# Patient Record
Sex: MALE | Race: BLACK OR AFRICAN AMERICAN | NOT HISPANIC OR LATINO | Employment: UNEMPLOYED | URBAN - METROPOLITAN AREA
[De-identification: names, ages, dates, MRNs, and addresses within clinical notes are randomized per-mention and may not be internally consistent; named-entity substitution may affect disease eponyms.]

---

## 2024-06-13 ENCOUNTER — APPOINTMENT (OUTPATIENT)
Dept: CARDIOLOGY | Facility: HOSPITAL | Age: 69
End: 2024-06-13
Payer: OTHER GOVERNMENT

## 2024-06-13 ENCOUNTER — HOSPITAL ENCOUNTER (EMERGENCY)
Facility: HOSPITAL | Age: 69
Discharge: HOME | End: 2024-06-13
Attending: EMERGENCY MEDICINE
Payer: OTHER GOVERNMENT

## 2024-06-13 ENCOUNTER — APPOINTMENT (OUTPATIENT)
Dept: RADIOLOGY | Facility: HOSPITAL | Age: 69
End: 2024-06-13
Payer: OTHER GOVERNMENT

## 2024-06-13 VITALS
WEIGHT: 180 LBS | HEIGHT: 72 IN | OXYGEN SATURATION: 94 % | BODY MASS INDEX: 24.38 KG/M2 | DIASTOLIC BLOOD PRESSURE: 87 MMHG | RESPIRATION RATE: 14 BRPM | HEART RATE: 80 BPM | SYSTOLIC BLOOD PRESSURE: 156 MMHG

## 2024-06-13 DIAGNOSIS — R07.9 CHEST PAIN, UNSPECIFIED TYPE: Primary | ICD-10-CM

## 2024-06-13 LAB
ALBUMIN SERPL BCP-MCNC: 4.1 G/DL (ref 3.4–5)
ALP SERPL-CCNC: 79 U/L (ref 33–136)
ALT SERPL W P-5'-P-CCNC: 20 U/L (ref 10–52)
ANION GAP SERPL CALC-SCNC: 13 MMOL/L (ref 10–20)
AST SERPL W P-5'-P-CCNC: 21 U/L (ref 9–39)
BASOPHILS # BLD AUTO: 0.03 X10*3/UL (ref 0–0.1)
BASOPHILS NFR BLD AUTO: 0.5 %
BILIRUB SERPL-MCNC: 0.4 MG/DL (ref 0–1.2)
BNP SERPL-MCNC: 20 PG/ML (ref 0–99)
BUN SERPL-MCNC: 14 MG/DL (ref 6–23)
CALCIUM SERPL-MCNC: 9 MG/DL (ref 8.6–10.3)
CARDIAC TROPONIN I PNL SERPL HS: 7 NG/L (ref 0–20)
CARDIAC TROPONIN I PNL SERPL HS: 8 NG/L (ref 0–20)
CHLORIDE SERPL-SCNC: 105 MMOL/L (ref 98–107)
CO2 SERPL-SCNC: 24 MMOL/L (ref 21–32)
CREAT SERPL-MCNC: 1.78 MG/DL (ref 0.5–1.3)
EGFRCR SERPLBLD CKD-EPI 2021: 41 ML/MIN/1.73M*2
EOSINOPHIL # BLD AUTO: 0.22 X10*3/UL (ref 0–0.7)
EOSINOPHIL NFR BLD AUTO: 3.5 %
ERYTHROCYTE [DISTWIDTH] IN BLOOD BY AUTOMATED COUNT: 13.7 % (ref 11.5–14.5)
GLUCOSE SERPL-MCNC: 115 MG/DL (ref 74–99)
HCT VFR BLD AUTO: 39.9 % (ref 41–52)
HGB BLD-MCNC: 12.7 G/DL (ref 13.5–17.5)
IMM GRANULOCYTES # BLD AUTO: 0.01 X10*3/UL (ref 0–0.7)
IMM GRANULOCYTES NFR BLD AUTO: 0.2 % (ref 0–0.9)
INR PPP: 1 (ref 0.9–1.1)
LYMPHOCYTES # BLD AUTO: 1.97 X10*3/UL (ref 1.2–4.8)
LYMPHOCYTES NFR BLD AUTO: 31.1 %
MAGNESIUM SERPL-MCNC: 1.49 MG/DL (ref 1.6–2.4)
MCH RBC QN AUTO: 26.1 PG (ref 26–34)
MCHC RBC AUTO-ENTMCNC: 31.8 G/DL (ref 32–36)
MCV RBC AUTO: 82 FL (ref 80–100)
MONOCYTES # BLD AUTO: 0.65 X10*3/UL (ref 0.1–1)
MONOCYTES NFR BLD AUTO: 10.3 %
NEUTROPHILS # BLD AUTO: 3.46 X10*3/UL (ref 1.2–7.7)
NEUTROPHILS NFR BLD AUTO: 54.4 %
NRBC BLD-RTO: 0 /100 WBCS (ref 0–0)
PLATELET # BLD AUTO: 233 X10*3/UL (ref 150–450)
POTASSIUM SERPL-SCNC: 3.7 MMOL/L (ref 3.5–5.3)
PROT SERPL-MCNC: 7.2 G/DL (ref 6.4–8.2)
PROTHROMBIN TIME: 11.5 SECONDS (ref 9.8–12.8)
RBC # BLD AUTO: 4.87 X10*6/UL (ref 4.5–5.9)
SODIUM SERPL-SCNC: 138 MMOL/L (ref 136–145)
WBC # BLD AUTO: 6.3 X10*3/UL (ref 4.4–11.3)

## 2024-06-13 PROCEDURE — 83735 ASSAY OF MAGNESIUM: CPT | Performed by: EMERGENCY MEDICINE

## 2024-06-13 PROCEDURE — 2500000002 HC RX 250 W HCPCS SELF ADMINISTERED DRUGS (ALT 637 FOR MEDICARE OP, ALT 636 FOR OP/ED): Performed by: NURSE PRACTITIONER

## 2024-06-13 PROCEDURE — 36415 COLL VENOUS BLD VENIPUNCTURE: CPT | Performed by: EMERGENCY MEDICINE

## 2024-06-13 PROCEDURE — 85025 COMPLETE CBC W/AUTO DIFF WBC: CPT | Performed by: EMERGENCY MEDICINE

## 2024-06-13 PROCEDURE — 99284 EMERGENCY DEPT VISIT MOD MDM: CPT | Mod: 25

## 2024-06-13 PROCEDURE — 85610 PROTHROMBIN TIME: CPT | Performed by: EMERGENCY MEDICINE

## 2024-06-13 PROCEDURE — 83880 ASSAY OF NATRIURETIC PEPTIDE: CPT | Performed by: EMERGENCY MEDICINE

## 2024-06-13 PROCEDURE — 93005 ELECTROCARDIOGRAM TRACING: CPT

## 2024-06-13 PROCEDURE — 71045 X-RAY EXAM CHEST 1 VIEW: CPT

## 2024-06-13 PROCEDURE — 96366 THER/PROPH/DIAG IV INF ADDON: CPT

## 2024-06-13 PROCEDURE — 71045 X-RAY EXAM CHEST 1 VIEW: CPT | Mod: FOREIGN READ | Performed by: RADIOLOGY

## 2024-06-13 PROCEDURE — 84484 ASSAY OF TROPONIN QUANT: CPT | Performed by: EMERGENCY MEDICINE

## 2024-06-13 PROCEDURE — 80053 COMPREHEN METABOLIC PANEL: CPT | Performed by: EMERGENCY MEDICINE

## 2024-06-13 PROCEDURE — 94640 AIRWAY INHALATION TREATMENT: CPT

## 2024-06-13 PROCEDURE — 2500000004 HC RX 250 GENERAL PHARMACY W/ HCPCS (ALT 636 FOR OP/ED): Performed by: NURSE PRACTITIONER

## 2024-06-13 PROCEDURE — 96365 THER/PROPH/DIAG IV INF INIT: CPT

## 2024-06-13 RX ORDER — IPRATROPIUM BROMIDE AND ALBUTEROL SULFATE 2.5; .5 MG/3ML; MG/3ML
3 SOLUTION RESPIRATORY (INHALATION) ONCE
Status: COMPLETED | OUTPATIENT
Start: 2024-06-13 | End: 2024-06-13

## 2024-06-13 RX ORDER — MAGNESIUM SULFATE HEPTAHYDRATE 40 MG/ML
2 INJECTION, SOLUTION INTRAVENOUS ONCE
Status: COMPLETED | OUTPATIENT
Start: 2024-06-13 | End: 2024-06-13

## 2024-06-13 ASSESSMENT — HEART SCORE
ECG: NORMAL
TROPONIN: LESS THAN OR EQUAL TO NORMAL LIMIT
HEART SCORE: 4
RISK FACTORS: >2 RISK FACTORS OR HX OF ATHEROSCLEROTIC DISEASE
HISTORY: SLIGHTLY SUSPICIOUS
AGE: 65+

## 2024-06-13 ASSESSMENT — PAIN DESCRIPTION - ORIENTATION: ORIENTATION: MID

## 2024-06-13 ASSESSMENT — PAIN DESCRIPTION - PAIN TYPE: TYPE: ACUTE PAIN

## 2024-06-13 ASSESSMENT — PAIN DESCRIPTION - DESCRIPTORS
DESCRIPTORS: PRESSURE
DESCRIPTORS: PRESSURE

## 2024-06-13 ASSESSMENT — COLUMBIA-SUICIDE SEVERITY RATING SCALE - C-SSRS
1. IN THE PAST MONTH, HAVE YOU WISHED YOU WERE DEAD OR WISHED YOU COULD GO TO SLEEP AND NOT WAKE UP?: NO
6. HAVE YOU EVER DONE ANYTHING, STARTED TO DO ANYTHING, OR PREPARED TO DO ANYTHING TO END YOUR LIFE?: NO
2. HAVE YOU ACTUALLY HAD ANY THOUGHTS OF KILLING YOURSELF?: NO

## 2024-06-13 ASSESSMENT — LIFESTYLE VARIABLES
TOTAL SCORE: 0
EVER FELT BAD OR GUILTY ABOUT YOUR DRINKING: NO
HAVE PEOPLE ANNOYED YOU BY CRITICIZING YOUR DRINKING: NO
HAVE YOU EVER FELT YOU SHOULD CUT DOWN ON YOUR DRINKING: NO
EVER HAD A DRINK FIRST THING IN THE MORNING TO STEADY YOUR NERVES TO GET RID OF A HANGOVER: NO

## 2024-06-13 ASSESSMENT — PAIN SCALES - GENERAL
PAINLEVEL_OUTOF10: 3
PAINLEVEL_OUTOF10: 3

## 2024-06-13 ASSESSMENT — PAIN - FUNCTIONAL ASSESSMENT: PAIN_FUNCTIONAL_ASSESSMENT: 0-10

## 2024-06-13 ASSESSMENT — PAIN DESCRIPTION - PROGRESSION: CLINICAL_PROGRESSION: GRADUALLY IMPROVING

## 2024-06-13 ASSESSMENT — PAIN DESCRIPTION - LOCATION: LOCATION: CHEST

## 2024-06-13 NOTE — ED PROVIDER NOTES
HPI   Chief Complaint   Patient presents with    Chest Pain     EMS gave 324 aspirin and 2 nitroglycerin     Shortness of Breath       69-year-old male presents emergency department, patient states last night that he was having gas, description sharp pains epigastric region.  States took some Tums and went to bed, woke up again with the same sensation this morning.  Patient states about an hour ago pain got significantly worse, instead of being a sharp pain was more of a pressure and tightness in his chest, made him feel very short of breath.  No nausea or diaphoresis.  Patient rated that 7/10.  Since arriving to the ED pain and pressure is now at a 3/10.    Patient states history of COPD, hypertension, states he is prescribed medication for high cholesterol but admits he does not always take it.    Patient is from [facility]      History provided by:  Patient   used: No                        Tyson Coma Scale Score: 15   HEART Score: 4                   Patient History   No past medical history on file.  No past surgical history on file.  No family history on file.  Social History     Tobacco Use    Smoking status: Not on file    Smokeless tobacco: Not on file   Substance Use Topics    Alcohol use: Not on file    Drug use: Not on file       Physical Exam   ED Triage Vitals   Temp Pulse Resp BP   -- -- -- --      SpO2 Temp src Heart Rate Source Patient Position   -- -- -- --      BP Location FiO2 (%)     -- --       Physical Exam  Physical Exam:  Constitutional: Vitals noted, no distress. Afebrile.   Cardiovascular: Regular, rate, rhythm, no murmur.   Pulmonary: Lungs clear bilaterally with good aeration. No adventitious breath sounds.   Gastrointestinal: Soft, nonsurgical. Nontender. No peritoneal signs. Normoactive bowel sounds.   Musculoskeletal: No peripheral edema. Negative Homans bilaterally, no cords.   Skin: No rash.   Neuro: No focal neurologic deficits, NIH score of 0.    ED Course & MDM  "  Diagnoses as of 06/13/24 1326   Chest pain, unspecified type       Medical Decision Making    Initial EKG obtained at 10:48 AM with ventricular of 78, as per by me, shows normal sinus rhythm with sinus arrhythmia, left axis deviation, unremarkable ST and T wave patterns, no evidence of acute ischemia or other acute findings.    Cardiac workup initiated, CBC unremarkable, metabolic panel reflects an element of chronic kidney disease, although consistent with the patient's baseline, initial troponin 8, delta troponin 7.  Magnesium is low at 1.49, will replete with 2 g IV piggyback.    Chest x-ray, as interpreted by me, shows no acute cardiopulmonary process.      Repeat EKG at 1208 with ventricular of 69, as read by me, shows normal sinus rhythm with left axis deviation, unremarkable ST and T wave patterns, no evidence of acute ischemia or other acute finding.    Patient's pain has completely resolved.  With his negative troponin x 2, heart score 4 due to the patient's age and risk factors, discussed disposition, ultimately will be disposition back to the facility.  Will continue to monitor his symptoms, return with any additional symptoms or additional concerns.    I utilized an evidence-based risk rating tool (CMT) along with my training and experience to weigh the risk of discharge against the risks of further testing, imaging, or hospitalization. At this time I estimate the risks of additional testing, imaging, or hospitalization to be equal to or greater than the risk of discharge. I discussed my risk assessment with the patient and the patient consents to the risk of discharge as well as the risk of uncertainty in estimating outcomes.    The patient's HEART Score is <4. In rare cases, I give patients with HEART Score of 4 the option of discharge, but only when they meet criteria for \"Low 4,\" meaning that HST was used, and the 4 is not from a highly suspicious story, highly suspicious EKG, or positive cardiac " "enzymes. In these selected cases, the risk of a \"Low 4\" is still most likely lower than the risk of admission and further testing/imaging. [id number]           Shared RON Attestation:    This patient was seen by the advanced practice provider.  I personally saw the patient and made/approved the management plan and take responsibility for the patient management.    History: 69-year-old male presents with chest pain.    Exam: Regular rate and rhythm cardiac exam with clear breath sounds bilaterally.  Abdomen is soft and nontender.  Negative Homans' sign bilaterally.  Neurological exam is grossly intact.    MDM: ACS, arrhythmia, gastritis, GERD, pancreatitis    I have seen and examined the patient, agree with the workup, evaluation, medical decision making, management and diagnosis.  The care plan has been discussed.    Mart Holloway MD      Procedure  Procedures     Gardenia Root, FRANCES-CNP  06/13/24 1326    "

## 2024-06-16 LAB
ATRIAL RATE: 69 BPM
ATRIAL RATE: 78 BPM
P AXIS: 68 DEGREES
P AXIS: 70 DEGREES
P OFFSET: 199 MS
P OFFSET: 203 MS
P ONSET: 144 MS
P ONSET: 149 MS
PR INTERVAL: 132 MS
PR INTERVAL: 140 MS
Q ONSET: 214 MS
Q ONSET: 215 MS
QRS COUNT: 11 BEATS
QRS COUNT: 13 BEATS
QRS DURATION: 84 MS
QRS DURATION: 84 MS
QT INTERVAL: 404 MS
QT INTERVAL: 410 MS
QTC CALCULATION(BAZETT): 432 MS
QTC CALCULATION(BAZETT): 467 MS
QTC FREDERICIA: 423 MS
QTC FREDERICIA: 447 MS
R AXIS: -66 DEGREES
R AXIS: -69 DEGREES
T AXIS: 19 DEGREES
T AXIS: 31 DEGREES
T OFFSET: 416 MS
T OFFSET: 420 MS
VENTRICULAR RATE: 69 BPM
VENTRICULAR RATE: 78 BPM